# Patient Record
Sex: MALE | Race: BLACK OR AFRICAN AMERICAN | NOT HISPANIC OR LATINO | Employment: STUDENT | URBAN - METROPOLITAN AREA
[De-identification: names, ages, dates, MRNs, and addresses within clinical notes are randomized per-mention and may not be internally consistent; named-entity substitution may affect disease eponyms.]

---

## 2017-07-26 PROCEDURE — 99283 EMERGENCY DEPT VISIT LOW MDM: CPT

## 2017-07-27 ENCOUNTER — HOSPITAL ENCOUNTER (EMERGENCY)
Facility: HOSPITAL | Age: 22
Discharge: HOME | End: 2017-07-27
Attending: EMERGENCY MEDICINE
Payer: COMMERCIAL

## 2017-07-27 VITALS
OXYGEN SATURATION: 100 % | SYSTOLIC BLOOD PRESSURE: 128 MMHG | RESPIRATION RATE: 18 BRPM | BODY MASS INDEX: 22.6 KG/M2 | HEIGHT: 67 IN | TEMPERATURE: 99 F | WEIGHT: 144 LBS | DIASTOLIC BLOOD PRESSURE: 76 MMHG | HEART RATE: 82 BPM

## 2017-07-27 DIAGNOSIS — K52.9 GASTROENTERITIS: Primary | ICD-10-CM

## 2017-07-27 LAB
BACTERIA #/AREA URNS AUTO: NORMAL /HPF
BILIRUB UR QL STRIP: NEGATIVE
CLARITY UR REFRACT.AUTO: CLEAR
COLOR UR AUTO: ABNORMAL
GLUCOSE UR QL STRIP: NEGATIVE
HGB UR QL STRIP: NEGATIVE
HYALINE CASTS UR QL AUTO: 0 /LPF
KETONES UR QL STRIP: NEGATIVE
LEUKOCYTE ESTERASE UR QL STRIP: NEGATIVE
MICROSCOPIC COMMENT: NORMAL
NITRITE UR QL STRIP: NEGATIVE
PH UR STRIP: 6 [PH] (ref 5–8)
PROT UR QL STRIP: ABNORMAL
RBC #/AREA URNS AUTO: 1 /HPF (ref 0–4)
SP GR UR STRIP: 1.02 (ref 1–1.03)
URN SPEC COLLECT METH UR: ABNORMAL
UROBILINOGEN UR STRIP-ACNC: NEGATIVE EU/DL
WBC #/AREA URNS AUTO: 2 /HPF (ref 0–5)

## 2017-07-27 PROCEDURE — 81000 URINALYSIS NONAUTO W/SCOPE: CPT

## 2017-07-27 RX ORDER — ONDANSETRON 4 MG/1
4 TABLET, FILM COATED ORAL EVERY 6 HOURS
Qty: 12 TABLET | Refills: 0 | Status: SHIPPED | OUTPATIENT
Start: 2017-07-27

## 2017-07-27 NOTE — ED PROVIDER NOTES
Encounter Date: 7/26/2017       History     Chief Complaint   Patient presents with    Abdominal Pain     Diarrhea and cramping started Monday. Denies vomiting. Cramping comes and goes. Noticed stool has changed in color. Wiped after passing a bowel movement and blood was on the tissue. complaints of  Dizziness      The history is provided by the patient.   General Illness    The current episode started today. The problem occurs frequently. The problem has been unchanged. The pain is at a severity of 2/10. Nothing relieves the symptoms. Nothing aggravates the symptoms. Associated symptoms include diarrhea and nausea. Pertinent negatives include no fever, no double vision, no eye itching, no photophobia, no abdominal pain, no constipation, no vomiting, no congestion, no ear discharge, no headaches, no muscle aches, no neck pain, no neck stiffness, no cough, no shortness of breath, no URI, no wheezing, no rash, no diaper rash, no discharge, no pain and no eye redness.     Review of patient's allergies indicates:  No Known Allergies  Past Medical History:   Diagnosis Date    GERD (gastroesophageal reflux disease)      Past Surgical History:   Procedure Laterality Date    SPINAL FUSION       History reviewed. No pertinent family history.  Social History   Substance Use Topics    Smoking status: Former Smoker    Smokeless tobacco: Never Used    Alcohol use No     Review of Systems   Constitutional: Negative for fever.   HENT: Negative for congestion and ear discharge.    Eyes: Negative for double vision, photophobia, pain, discharge, redness and itching.   Respiratory: Negative for cough, shortness of breath and wheezing.    Gastrointestinal: Positive for diarrhea and nausea. Negative for abdominal pain, constipation and vomiting.   Musculoskeletal: Negative for neck pain.   Skin: Negative for rash.   Neurological: Negative for headaches.   All other systems reviewed and are negative.      Physical Exam     Initial  Vitals [07/27/17 0012]   BP Pulse Resp Temp SpO2   130/89 86 20 98.3 °F (36.8 °C) 98 %      MAP       102.67         Physical Exam    Nursing note and vitals reviewed.  Constitutional: He appears well-developed and well-nourished.   HENT:   Head: Normocephalic and atraumatic.   Eyes: EOM are normal.   Neck: Normal range of motion. Neck supple.   Cardiovascular: Normal rate, regular rhythm, normal heart sounds and intact distal pulses.   Pulmonary/Chest: Breath sounds normal.   Abdominal: Soft. He exhibits no distension. There is no tenderness.   Musculoskeletal: Normal range of motion.   Neurological: He is alert and oriented to person, place, and time.   Skin: Skin is dry.   Psychiatric: He has a normal mood and affect. His behavior is normal. Judgment and thought content normal.         ED Course   Procedures  Labs Reviewed   URINALYSIS                               ED Course     Clinical Impression:   The encounter diagnosis was Gastroenteritis.    Disposition:   Disposition: Discharged  Condition: Stable                        Keila Bahena MD  07/27/17 0116

## 2021-07-25 ENCOUNTER — IMMUNIZATION (OUTPATIENT)
Dept: PRIMARY CARE CLINIC | Facility: CLINIC | Age: 26
End: 2021-07-25
Payer: COMMERCIAL

## 2021-07-25 DIAGNOSIS — Z23 NEED FOR VACCINATION: Primary | ICD-10-CM

## 2021-07-25 PROCEDURE — 91300 COVID-19, MRNA, LNP-S, PF, 30 MCG/0.3 ML DOSE VACCINE: ICD-10-PCS | Mod: S$GLB,,, | Performed by: INTERNAL MEDICINE

## 2021-07-25 PROCEDURE — 0001A COVID-19, MRNA, LNP-S, PF, 30 MCG/0.3 ML DOSE VACCINE: CPT | Mod: CV19,S$GLB,, | Performed by: INTERNAL MEDICINE

## 2021-07-25 PROCEDURE — 91300 COVID-19, MRNA, LNP-S, PF, 30 MCG/0.3 ML DOSE VACCINE: CPT | Mod: S$GLB,,, | Performed by: INTERNAL MEDICINE

## 2021-07-25 PROCEDURE — 0001A COVID-19, MRNA, LNP-S, PF, 30 MCG/0.3 ML DOSE VACCINE: ICD-10-PCS | Mod: CV19,S$GLB,, | Performed by: INTERNAL MEDICINE

## 2021-08-15 ENCOUNTER — IMMUNIZATION (OUTPATIENT)
Dept: PRIMARY CARE CLINIC | Facility: CLINIC | Age: 26
End: 2021-08-15
Payer: COMMERCIAL

## 2021-08-15 DIAGNOSIS — Z23 NEED FOR VACCINATION: Primary | ICD-10-CM

## 2021-08-15 PROCEDURE — 0002A COVID-19, MRNA, LNP-S, PF, 30 MCG/0.3 ML DOSE VACCINE: CPT | Mod: CV19,S$GLB,, | Performed by: INTERNAL MEDICINE

## 2021-08-15 PROCEDURE — 0002A COVID-19, MRNA, LNP-S, PF, 30 MCG/0.3 ML DOSE VACCINE: ICD-10-PCS | Mod: CV19,S$GLB,, | Performed by: INTERNAL MEDICINE

## 2021-08-15 PROCEDURE — 91300 COVID-19, MRNA, LNP-S, PF, 30 MCG/0.3 ML DOSE VACCINE: ICD-10-PCS | Mod: S$GLB,,, | Performed by: INTERNAL MEDICINE

## 2021-08-15 PROCEDURE — 91300 COVID-19, MRNA, LNP-S, PF, 30 MCG/0.3 ML DOSE VACCINE: CPT | Mod: S$GLB,,, | Performed by: INTERNAL MEDICINE

## 2022-03-21 ENCOUNTER — HOSPITAL ENCOUNTER (EMERGENCY)
Facility: HOSPITAL | Age: 27
Discharge: HOME | End: 2022-03-21
Attending: EMERGENCY MEDICINE
Payer: COMMERCIAL

## 2022-03-21 VITALS
RESPIRATION RATE: 16 BRPM | HEART RATE: 72 BPM | DIASTOLIC BLOOD PRESSURE: 95 MMHG | SYSTOLIC BLOOD PRESSURE: 138 MMHG | BODY MASS INDEX: 25.58 KG/M2 | WEIGHT: 163 LBS | HEIGHT: 67 IN | OXYGEN SATURATION: 97 % | TEMPERATURE: 98 F

## 2022-03-21 DIAGNOSIS — M24.412 RECURRENT ANTERIOR DISLOCATION OF LEFT SHOULDER: Primary | ICD-10-CM

## 2022-03-21 PROCEDURE — 23650 CLTX SHO DSLC W/MNPJ WO ANES: CPT | Mod: LT

## 2022-03-21 PROCEDURE — 99283 EMERGENCY DEPT VISIT LOW MDM: CPT | Mod: 25,ER

## 2022-03-21 PROCEDURE — 25000003 PHARM REV CODE 250: Mod: ER | Performed by: EMERGENCY MEDICINE

## 2022-03-21 RX ORDER — KETOROLAC TROMETHAMINE 10 MG/1
10 TABLET, FILM COATED ORAL EVERY 8 HOURS PRN
Qty: 15 TABLET | Refills: 0 | Status: SHIPPED | OUTPATIENT
Start: 2022-03-21

## 2022-03-21 RX ORDER — KETOROLAC TROMETHAMINE 10 MG/1
10 TABLET, FILM COATED ORAL
Status: COMPLETED | OUTPATIENT
Start: 2022-03-21 | End: 2022-03-21

## 2022-03-21 RX ADMIN — KETOROLAC TROMETHAMINE 10 MG: 10 TABLET, FILM COATED ORAL at 03:03

## 2022-03-21 NOTE — ED PROVIDER NOTES
"Encounter Date: 3/21/2022       History     Chief Complaint   Patient presents with    Shoulder Pain     Pt to the Er with left shoulder dislocation. Pt woke like this 2 hours ago. Reports a hx of this and states "I can usually put it back in but it won't tonight. Pt took nothing for pain     Patient currently presents with a chief complaint of injury to the R shoulder.  This was acquired while the patient was sleeping though he has experienced this before with dislocation of the shoulder.  Patient notes associated symptoms of pain and limited ROM.          Review of patient's allergies indicates:  No Known Allergies  Past Medical History:   Diagnosis Date    GERD (gastroesophageal reflux disease)      Past Surgical History:   Procedure Laterality Date    SPINAL FUSION       History reviewed. No pertinent family history.  Social History     Tobacco Use    Smoking status: Former Smoker    Smokeless tobacco: Never Used   Substance Use Topics    Alcohol use: No    Drug use: No     Review of Systems   Constitutional: Negative for chills and fever.   HENT: Negative for congestion and sore throat.    Respiratory: Negative for chest tightness and shortness of breath.    Cardiovascular: Negative for chest pain and palpitations.   Gastrointestinal: Negative for abdominal pain and vomiting.   Genitourinary: Negative for difficulty urinating and dysuria.   Skin: Negative for color change and rash.   Allergic/Immunologic: Negative for immunocompromised state.   Neurological: Negative for weakness and numbness.   Hematological: Negative for adenopathy.   All other systems reviewed and are negative.    Physical Exam     Initial Vitals [03/21/22 0313]   BP Pulse Resp Temp SpO2   (!) 138/95 79 18 98.3 °F (36.8 °C) 99 %      MAP       --         Physical Exam    Constitutional: He appears well-developed and well-nourished. He is not diaphoretic. No distress.   HENT:   Head: Normocephalic and atraumatic.   Cardiovascular: " Normal rate, regular rhythm, normal heart sounds and intact distal pulses.   Pulmonary/Chest: Breath sounds normal. No respiratory distress.     Neurological: He is alert and oriented to person, place, and time.   Skin: Skin is warm and dry.         ED Course   Orthopedic Injury    Date/Time: 3/21/2022 5:03 AM  Performed by: Jamie Guerrero MD  Authorized by: Jamie Guerrero MD     Location procedure was performed:  [facility] EMERGENCY DEPARTMENT  Consent Done?:  Yes  Universal Protocol:     Verbal consent obtained?: Yes      Risks and benefits: Risks, benefits and alternatives were discussed      Consent given by:  Patient  Injury:     Injury location:  Shoulder    Location details:  Left shoulder    Injury type:  Dislocation    Dislocation type: anterior      Chronicity:  Recurrent      Pre-procedure assessment:     Neurovascular status: Neurovascularly intact      Distal perfusion: normal      Neurological function: normal      Range of motion: reduced        Selections made in this section will also lock the Injury type section above.:     Manipulation performed?: Yes      Reduction method:  External rotation    Reduction method:  External rotation    Reduction method:  External rotation    Reduction method:  External rotation    Reduction method:  External rotation    Reduction method:  External rotation    Reduction successful?: Yes      Confirmation: Reduction confirmed by x-ray      Immobilization:  Sling  Post-procedure assessment:     Neurovascular status: Neurovascularly intact      Distal perfusion: normal      Neurological function: normal      Range of motion: normal         Labs Reviewed - No data to display       Imaging Results          X-Ray Shoulder 2 or More Views Left (In process)               X-Rays:   Independently Interpreted Readings:   Other Readings:  2 views L shoulder shows no evidence of fracture or dislocation    Medications   ketorolac tablet 10 mg (10 mg Oral Given 3/21/22 3410)      Medical Decision Making:   ED Management:  All findings were reviewed with the patient/family in detail.  I see no indication of an emergent process beyond that addressed during our encounter but have duly counseled the patient/family regarding the need for prompt follow-up as well as the indications that should prompt immediate return to the emergency room should new or worrisome developments occur.  The patient has additionally been provided with printed information regarding diagnosis as well as instructions regarding follow up and any medications intended to manage the patient's aforementioned conditions.  The patient/family communicates understanding of all this information and all remaining questions and concerns were addressed at this time.                            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 Provider    ketorolac (TORADOL) 10 mg tablet Take 1 tablet (10 mg total) by mouth every 8 (eight) hours as needed for Pain. 15 tablet 3/21/2022  Jamie Guerrero MD        Follow-up Information     Follow up With Specialties Details Why Contact Info    Jose Suarez MD Family Medicine Schedule an appointment as soon as possible for a visit  reassessment/consideration for specialty referral [facility] [address] [facility] [address]  [phone]      [facility] - Emergency Dept Emergency Medicine Go to  As needed, If symptoms worsen [address] [facility] [address] [phone]           Jamie Guerrero MD  03/21/22 1883

## 2022-03-21 NOTE — ED NOTES
Physical Assessment    LOC: The patient is awake, alert and aware of environment with an appropriate affect, the patient is oriented x 3 and speaking appropriately.     Psych: Patient is calm and cooperative with good eye contact.    APPEARANCE: Patient is clean and non toxic appearing    NEUROLOGIC:  EVAN, Follows commands without difficulty. Speech is clear. No neuro deficits observed.    HEENT: Denies HEENT complaint or injury, moist mucus membranes     RESPIRATORY: Airway is open and patent, respirations are spontaneous; patient has a normal effort and rate. Bilateral breath sounds are clear. Pink nailbeds.     CARDIAC: Patient has a normal rate and rhythm, no peripheral edema noted, capillary refill < 2 seconds. PULSES are symmetrical in all extremities    GI/ : Soft and non tender to palpation, no distention noted.     MUSCULOSKELETAL:  Range of motion limited left shoulder noted. Moves all extremities well except lt shoulder, c/o pain left shoulder, anterior dislocation deformity noted to left shoulder. - NO swelling, Palpable radial pulses bilaterally - sensation intact - cap refill <2 seconds    SKIN: The skin is warm, dry and intact. Patient has normal skin turgor. No rashes or lesions. No Breakdown noted.

## 2022-03-21 NOTE — DISCHARGE INSTRUCTIONS
Thank you for allowing our emergency team to care for you here today.  We certainly hope you are well on your way to feeling better soon.  Please do not hesitate to return to us with any additional concerns that may arise from this or any new problem you encounter.     Our goal in the emergency department is to always give you outstanding care and exceptional service. You may receive a survey by mail or e-mail in the next week regarding your experience in our ED. We would greatly appreciate you completing and returning the survey. Your feedback provides us with a way to recognize our staff who give very good care and it helps us learn how to improve when your experience was below our aspiration of excellence.        Sincerely,         aJmie Guerrero MD  Emergency Medicine  [facility]

## 2024-06-04 ENCOUNTER — OFFICE VISIT (OUTPATIENT)
Dept: UROLOGY | Facility: CLINIC | Age: 29
End: 2024-06-04
Payer: COMMERCIAL

## 2024-06-04 ENCOUNTER — TELEPHONE (OUTPATIENT)
Dept: UROLOGY | Facility: CLINIC | Age: 29
End: 2024-06-04
Payer: COMMERCIAL

## 2024-06-04 ENCOUNTER — LAB VISIT (OUTPATIENT)
Dept: LAB | Facility: HOSPITAL | Age: 29
End: 2024-06-04
Payer: COMMERCIAL

## 2024-06-04 VITALS
HEART RATE: 61 BPM | WEIGHT: 185.88 LBS | DIASTOLIC BLOOD PRESSURE: 85 MMHG | SYSTOLIC BLOOD PRESSURE: 141 MMHG | BODY MASS INDEX: 28.17 KG/M2 | HEIGHT: 68 IN

## 2024-06-04 DIAGNOSIS — Q54.0 GLANULAR HYPOSPADIAS: ICD-10-CM

## 2024-06-04 DIAGNOSIS — Z98.890 H/O DILATION OF URETHRA: ICD-10-CM

## 2024-06-04 DIAGNOSIS — N34.2 URETHRITIS: ICD-10-CM

## 2024-06-04 DIAGNOSIS — N48.89 PENILE SWELLING: Primary | ICD-10-CM

## 2024-06-04 LAB
ALBUMIN SERPL BCP-MCNC: 4.4 G/DL (ref 3.5–5.2)
ALP SERPL-CCNC: 86 U/L (ref 55–135)
ALT SERPL W/O P-5'-P-CCNC: 33 U/L (ref 10–44)
ANION GAP SERPL CALC-SCNC: 9 MMOL/L (ref 8–16)
AST SERPL-CCNC: 32 U/L (ref 10–40)
BILIRUB SERPL-MCNC: 0.8 MG/DL (ref 0.1–1)
BILIRUB SERPL-MCNC: NORMAL MG/DL
BLOOD URINE, POC: NORMAL
BUN SERPL-MCNC: 12 MG/DL (ref 6–20)
CALCIUM SERPL-MCNC: 9.7 MG/DL (ref 8.7–10.5)
CHLORIDE SERPL-SCNC: 105 MMOL/L (ref 95–110)
CLARITY, POC UA: CLEAR
CO2 SERPL-SCNC: 29 MMOL/L (ref 23–29)
COLOR, POC UA: YELLOW
CREAT SERPL-MCNC: 1 MG/DL (ref 0.5–1.4)
EST. GFR  (NO RACE VARIABLE): >60 ML/MIN/1.73 M^2
GLUCOSE SERPL-MCNC: 89 MG/DL (ref 70–110)
GLUCOSE UR QL STRIP: NORMAL
KETONES UR QL STRIP: NORMAL
LEUKOCYTE ESTERASE URINE, POC: NORMAL
NITRITE, POC UA: NORMAL
PH, POC UA: 5
POTASSIUM SERPL-SCNC: 4.3 MMOL/L (ref 3.5–5.1)
PROT SERPL-MCNC: 7.8 G/DL (ref 6–8.4)
PROTEIN, POC: NORMAL
SODIUM SERPL-SCNC: 143 MMOL/L (ref 136–145)
SPECIFIC GRAVITY, POC UA: 1.01
UROBILINOGEN, POC UA: NORMAL

## 2024-06-04 PROCEDURE — 1160F RVW MEDS BY RX/DR IN RCRD: CPT | Mod: CPTII,S$GLB,,

## 2024-06-04 PROCEDURE — 87591 N.GONORRHOEAE DNA AMP PROB: CPT

## 2024-06-04 PROCEDURE — 99204 OFFICE O/P NEW MOD 45 MIN: CPT | Mod: S$GLB,,,

## 2024-06-04 PROCEDURE — 3008F BODY MASS INDEX DOCD: CPT | Mod: CPTII,S$GLB,,

## 2024-06-04 PROCEDURE — 3077F SYST BP >= 140 MM HG: CPT | Mod: CPTII,S$GLB,,

## 2024-06-04 PROCEDURE — 3079F DIAST BP 80-89 MM HG: CPT | Mod: CPTII,S$GLB,,

## 2024-06-04 PROCEDURE — 36415 COLL VENOUS BLD VENIPUNCTURE: CPT

## 2024-06-04 PROCEDURE — 87661 TRICHOMONAS VAGINALIS AMPLIF: CPT

## 2024-06-04 PROCEDURE — 99999 PR PBB SHADOW E&M-EST. PATIENT-LVL III: CPT | Mod: PBBFAC,,,

## 2024-06-04 PROCEDURE — 1159F MED LIST DOCD IN RCRD: CPT | Mod: CPTII,S$GLB,,

## 2024-06-04 PROCEDURE — 81002 URINALYSIS NONAUTO W/O SCOPE: CPT | Mod: S$GLB,,,

## 2024-06-04 PROCEDURE — 80053 COMPREHEN METABOLIC PANEL: CPT

## 2024-06-04 RX ORDER — TAMSULOSIN HYDROCHLORIDE 0.4 MG/1
0.4 CAPSULE ORAL DAILY
COMMUNITY

## 2024-06-04 RX ORDER — OXAPROZIN 600 MG/1
600 TABLET, FILM COATED ORAL EVERY 12 HOURS
Qty: 28 TABLET | Refills: 0 | Status: SHIPPED | OUTPATIENT
Start: 2024-06-04 | End: 2024-06-18

## 2024-06-04 RX ORDER — BACLOFEN 10 MG/1
10 GRANULE ORAL 3 TIMES DAILY
COMMUNITY

## 2024-06-04 NOTE — PROGRESS NOTES
CHIEF COMPLAINT:  Urethral burning      HISTORY OF PRESENTING ILLINESS:  Zackary Rodriguez is a 28 y.o. male new to [facility]. Presents today for urethral burning x 2 years. H/o of urethral dilation in the past, similar symptoms prior to dilation. States he was started on Flomax by a physician in [place], [place] recently. Stream is normal, does not split or spray. States urine culture and STD testing negative 4 months ago. Experiences urethral burning throughout the day, worse when he has poor water intake. He is here today with his grandmother.             REVIEW OF SYSTEMS:  Review of Systems   Constitutional:  Negative for chills and fever.   HENT:  Negative for congestion and sore throat.    Respiratory:  Negative for cough and shortness of breath.    Cardiovascular:  Negative for chest pain and palpitations.   Gastrointestinal:  Negative for nausea and vomiting.   Genitourinary:  Negative for dysuria, flank pain, frequency, hematuria and urgency.   Neurological:  Negative for dizziness and headaches.         PATIENT HISTORY:    Past Medical History:   Diagnosis Date    GERD (gastroesophageal reflux disease)        Past Surgical History:   Procedure Laterality Date    SPINAL FUSION         No family history on file.    Social History     Socioeconomic History    Marital status: Single   Tobacco Use    Smoking status: Former    Smokeless tobacco: Never   Substance and Sexual Activity    Alcohol use: No    Drug use: No    Sexual activity: Not Currently     Social Determinants of Health     Financial Resource Strain: Low Risk  (6/4/2024)    Overall Financial Resource Strain (CARDIA)     Difficulty of Paying Living Expenses: Not hard at all   Food Insecurity: No Food Insecurity (6/4/2024)    Hunger Vital Sign     Worried About Running Out of Food in the Last Year: Never true     Ran Out of Food in the Last Year: Never true   Physical Activity: Insufficiently Active (6/4/2024)    Exercise Vital Sign     Days of Exercise  per Week: 4 days     Minutes of Exercise per Session: 30 min   Stress: No Stress Concern Present (6/4/2024)    [employer] [place] [employer] - Occupational Stress Questionnaire     Feeling of Stress : Not at all   Housing Stability: Unknown (6/4/2024)    Housing Stability Vital Sign     Unable to Pay for Housing in the Last Year: No       Allergies:  Patient has no known allergies.    Medications:    Current Outpatient Medications:     baclofen (LYVISPAH) 10 mg GrPk, Take 10 mg by mouth 3 (three) times daily., Disp: , Rfl:     oxaprozin (DAYPRO) 600 mg tablet, Take 1 tablet (600 mg total) by mouth every 12 (twelve) hours. Take with food. for 14 days, Disp: 28 tablet, Rfl: 0    tamsulosin (FLOMAX) 0.4 mg Cap, Take 0.4 mg by mouth once daily., Disp: , Rfl:     PHYSICAL EXAMINATION:  Physical Exam  Constitutional:       Appearance: Normal appearance.   HENT:      Head: Normocephalic and atraumatic.      Right Ear: External ear normal.      Left Ear: External ear normal.      Nose: Nose normal.      Mouth/Throat:      Mouth: Mucous membranes are moist.   Pulmonary:      Effort: Pulmonary effort is normal. No respiratory distress.   Genitourinary:     Comments: Glanular hypospadias   Skin:     General: Skin is warm and dry.   Neurological:      General: No focal deficit present.      Mental Status: He is alert and oriented to person, place, and time.   Psychiatric:         Mood and Affect: Mood normal.         Behavior: Behavior normal.           LABS:  UA WNL         IMPRESSION:    Encounter Diagnoses   Name Primary?    Penile swelling Yes    Urethritis     H/O dilation of urethra     Glanular hypospadias          Assessment:       1. Penile swelling    2. Urethritis    3. H/O dilation of urethra    4. Glanular hypospadias        Plan:   - Urine sent for gonorrhea/chlamydia/trichomonas. Will call with results.   - Continue Flomax as prescribed.  - CMP today for renal function baseline. 14 day course of 600 mg  daypro q 12 hr with food for discomfort.   - Referral placed to Dr. Agee for further evaluation due to hx of urethral dilation in the past.     RTC for consult with Dr. Agee    I spent 45 minutes with the patient of which more than half was spent in direct consultation with the patient in regards to our treatment and plan.  We addressed the office findings and recent labs; any need to go ER today.   Education and recommendations of today's plan of care including home remedies and needed follow up with PCP.   We discussed the chief complaints; reviewed the LUTS and the possible contributory factors.   Reassurance no infection or visible blood seen in today's urine sample

## 2024-06-05 LAB
C TRACH DNA SPEC QL NAA+PROBE: NOT DETECTED
N GONORRHOEA DNA SPEC QL NAA+PROBE: NOT DETECTED

## 2024-06-06 LAB
SPECIMEN SOURCE: NORMAL
T VAGINALIS RRNA SPEC QL NAA+PROBE: NEGATIVE

## 2024-06-07 ENCOUNTER — TELEPHONE (OUTPATIENT)
Dept: UROLOGY | Facility: CLINIC | Age: 29
End: 2024-06-07
Payer: COMMERCIAL

## 2024-07-12 ENCOUNTER — TELEPHONE (OUTPATIENT)
Dept: UROLOGY | Facility: CLINIC | Age: 29
End: 2024-07-12
Payer: COMMERCIAL

## 2024-07-12 ENCOUNTER — TELEPHONE (OUTPATIENT)
Dept: UROLOGY | Facility: CLINIC | Age: 29
End: 2024-07-12

## 2024-07-12 ENCOUNTER — OFFICE VISIT (OUTPATIENT)
Dept: UROLOGY | Facility: CLINIC | Age: 29
End: 2024-07-12
Payer: COMMERCIAL

## 2024-07-12 VITALS
DIASTOLIC BLOOD PRESSURE: 79 MMHG | SYSTOLIC BLOOD PRESSURE: 137 MMHG | WEIGHT: 188.69 LBS | HEIGHT: 68 IN | BODY MASS INDEX: 28.6 KG/M2 | HEART RATE: 68 BPM

## 2024-07-12 DIAGNOSIS — R30.0 DYSURIA: ICD-10-CM

## 2024-07-12 DIAGNOSIS — Z98.890 H/O DILATION OF URETHRA: ICD-10-CM

## 2024-07-12 DIAGNOSIS — N35.919 STRICTURE OF MALE URETHRA, UNSPECIFIED STRICTURE TYPE: Primary | ICD-10-CM

## 2024-07-12 DIAGNOSIS — Z98.890 H/O SPINAL SURGERY: ICD-10-CM

## 2024-07-12 DIAGNOSIS — N34.2 URETHRITIS: Primary | ICD-10-CM

## 2024-07-12 PROCEDURE — 87086 URINE CULTURE/COLONY COUNT: CPT | Performed by: UROLOGY

## 2024-07-12 PROCEDURE — 99999 PR PBB SHADOW E&M-EST. PATIENT-LVL IV: CPT | Mod: PBBFAC,,, | Performed by: UROLOGY

## 2024-07-12 RX ORDER — DOXYCYCLINE 100 MG/1
100 CAPSULE ORAL 2 TIMES DAILY
Qty: 14 CAPSULE | Refills: 0 | Status: SHIPPED | OUTPATIENT
Start: 2024-07-12 | End: 2024-07-19

## 2024-07-12 NOTE — TELEPHONE ENCOUNTER
Spoke to the pt while in clinic to offer surgery date of 7/22 at the [place] location, pt accepted. Went over pre-op instructions while in clinic and informed the pt someone from the [place] location will call with arrival time. Pt verbalized understanding.   Dr. stauffer

## 2024-07-12 NOTE — PATIENT INSTRUCTIONS
Kimi (Ackerly) Jorge, my surgery scheduler will schedule your surgery ([phone]).  The risks and benefits of the procedure were discussed with the patient in detail.    The patient was told to stop all blood thinners prior to surgery.    Antibiotic soap shower a night before surgery      Start doxycycline Saturday before surgery

## 2024-07-12 NOTE — H&P (VIEW-ONLY)
CHIEF COMPLAINT:  Urethral burning, urgency      HISTORY OF PRESENTING ILLINESS:  Zackary Rodriguez is a 28 y.o. male new to [facility]. Presents today for urethral burning x 2 years. H/o of urethral dilation in the past, similar symptoms prior to dilation. States he was started on Flomax by a physician in [place], [place] recently. Stream is normal, does not split or spray. States urine culture and STD testing negative 4 months ago. Experiences urethral burning throughout the day, worse when he has poor water intake. He is here today with his grandmother.     S/p spinal surgery in 2013 for scoliosis at [facility]            REVIEW OF SYSTEMS:  Review of Systems   Constitutional:  Negative for chills and fever.   HENT:  Negative for congestion and sore throat.    Respiratory:  Negative for cough and shortness of breath.    Cardiovascular:  Negative for chest pain and palpitations.   Gastrointestinal:  Negative for nausea and vomiting.   Genitourinary:  Negative for dysuria, flank pain, frequency, hematuria and urgency.   Neurological:  Negative for dizziness and headaches.         PATIENT HISTORY:    Past Medical History:   Diagnosis Date    GERD (gastroesophageal reflux disease)        Past Surgical History:   Procedure Laterality Date    SPINAL FUSION         No family history on file.    Social History     Socioeconomic History    Marital status: Single   Tobacco Use    Smoking status: Former    Smokeless tobacco: Never   Substance and Sexual Activity    Alcohol use: No    Drug use: No    Sexual activity: Not Currently     Social Determinants of Health     Financial Resource Strain: Low Risk  (6/4/2024)    Overall Financial Resource Strain (CARDIA)     Difficulty of Paying Living Expenses: Not hard at all   Food Insecurity: No Food Insecurity (6/4/2024)    Hunger Vital Sign     Worried About Running Out of Food in the Last Year: Never true     Ran Out of Food in the Last Year: Never true   Physical  Activity: Insufficiently Active (6/4/2024)    Exercise Vital Sign     Days of Exercise per Week: 4 days     Minutes of Exercise per Session: 30 min   Stress: No Stress Concern Present (6/4/2024)    [employer] [place] [employer] - Occupational Stress Questionnaire     Feeling of Stress : Not at all   Housing Stability: Unknown (6/4/2024)    Housing Stability Vital Sign     Unable to Pay for Housing in the Last Year: No       Allergies:  Patient has no known allergies.    Medications:    Current Outpatient Medications:     baclofen (LYVISPAH) 10 mg GrPk, Take 10 mg by mouth 3 (three) times daily. (Patient not taking: Reported on 7/12/2024), Disp: , Rfl:     doxycycline (VIBRAMYCIN) 100 MG Cap, Take 1 capsule (100 mg total) by mouth 2 (two) times daily. for 14 doses, Disp: 14 capsule, Rfl: 0    tamsulosin (FLOMAX) 0.4 mg Cap, Take 0.4 mg by mouth once daily. (Patient not taking: Reported on 7/12/2024), Disp: , Rfl:     PHYSICAL EXAMINATION:  Physical Exam  Constitutional:       Appearance: Normal appearance.   HENT:      Head: Normocephalic and atraumatic.      Right Ear: External ear normal.      Left Ear: External ear normal.      Nose: Nose normal.      Mouth/Throat:      Mouth: Mucous membranes are moist.   Pulmonary:      Effort: Pulmonary effort is normal. No respiratory distress.   Genitourinary:     Comments: Glanular hypospadias   Skin:     General: Skin is warm and dry.   Neurological:      General: No focal deficit present.      Mental Status: He is alert and oriented to person, place, and time.   Psychiatric:         Mood and Affect: Mood normal.         Behavior: Behavior normal.           LABS:  UA WNL         IMPRESSION:    Encounter Diagnoses   Name Primary?    Urethritis Yes    H/O dilation of urethra     H/O Spinal surgery          Assessment:       Urethritis  -     Urine culture  -     doxycycline (VIBRAMYCIN) 100 MG Cap; Take 1 capsule (100 mg total) by mouth 2 (two) times daily. for 14  doses  Dispense: 14 capsule; Refill: 0    H/O dilation of urethra  -     Ambulatory referral/consult to Urology    H/O Spinal surgery  -     X-Ray Abdomen AP 1 View; Future; Expected date: 07/12/2024       Plan:   -he has experienced the problem with urination since he had a spine surgery over 10 years ago.  After russ catheter was removed, he began having the problem.  Had urethral dilation 1 year later and has done well until now.  So will evaluate him with RUG, cysto urethral dilation under general anesthesia.  All questions answered.  He will start doxy 3 days before his surgery.  So far no improvement noted on flomax.    Follow up in about 10 days (around 7/22/2024), or RUG, cysto urethral dilalation at [facility].

## 2024-07-12 NOTE — PROGRESS NOTES
CHIEF COMPLAINT:  Urethral burning, urgency      HISTORY OF PRESENTING ILLINESS:  Zackary Rodriguez is a 28 y.o. male new to [facility]. Presents today for urethral burning x 2 years. H/o of urethral dilation in the past, similar symptoms prior to dilation. States he was started on Flomax by a physician in [place], [place] recently. Stream is normal, does not split or spray. States urine culture and STD testing negative 4 months ago. Experiences urethral burning throughout the day, worse when he has poor water intake. He is here today with his grandmother.     S/p spinal surgery in 2013 for scoliosis at [facility]            REVIEW OF SYSTEMS:  Review of Systems   Constitutional:  Negative for chills and fever.   HENT:  Negative for congestion and sore throat.    Respiratory:  Negative for cough and shortness of breath.    Cardiovascular:  Negative for chest pain and palpitations.   Gastrointestinal:  Negative for nausea and vomiting.   Genitourinary:  Negative for dysuria, flank pain, frequency, hematuria and urgency.   Neurological:  Negative for dizziness and headaches.         PATIENT HISTORY:    Past Medical History:   Diagnosis Date    GERD (gastroesophageal reflux disease)        Past Surgical History:   Procedure Laterality Date    SPINAL FUSION         No family history on file.    Social History     Socioeconomic History    Marital status: Single   Tobacco Use    Smoking status: Former    Smokeless tobacco: Never   Substance and Sexual Activity    Alcohol use: No    Drug use: No    Sexual activity: Not Currently     Social Determinants of Health     Financial Resource Strain: Low Risk  (6/4/2024)    Overall Financial Resource Strain (CARDIA)     Difficulty of Paying Living Expenses: Not hard at all   Food Insecurity: No Food Insecurity (6/4/2024)    Hunger Vital Sign     Worried About Running Out of Food in the Last Year: Never true     Ran Out of Food in the Last Year: Never true   Physical  Activity: Insufficiently Active (6/4/2024)    Exercise Vital Sign     Days of Exercise per Week: 4 days     Minutes of Exercise per Session: 30 min   Stress: No Stress Concern Present (6/4/2024)    [employer] [place] [employer] - Occupational Stress Questionnaire     Feeling of Stress : Not at all   Housing Stability: Unknown (6/4/2024)    Housing Stability Vital Sign     Unable to Pay for Housing in the Last Year: No       Allergies:  Patient has no known allergies.    Medications:    Current Outpatient Medications:     baclofen (LYVISPAH) 10 mg GrPk, Take 10 mg by mouth 3 (three) times daily. (Patient not taking: Reported on 7/12/2024), Disp: , Rfl:     doxycycline (VIBRAMYCIN) 100 MG Cap, Take 1 capsule (100 mg total) by mouth 2 (two) times daily. for 14 doses, Disp: 14 capsule, Rfl: 0    tamsulosin (FLOMAX) 0.4 mg Cap, Take 0.4 mg by mouth once daily. (Patient not taking: Reported on 7/12/2024), Disp: , Rfl:     PHYSICAL EXAMINATION:  Physical Exam  Constitutional:       Appearance: Normal appearance.   HENT:      Head: Normocephalic and atraumatic.      Right Ear: External ear normal.      Left Ear: External ear normal.      Nose: Nose normal.      Mouth/Throat:      Mouth: Mucous membranes are moist.   Pulmonary:      Effort: Pulmonary effort is normal. No respiratory distress.   Genitourinary:     Comments: Glanular hypospadias   Skin:     General: Skin is warm and dry.   Neurological:      General: No focal deficit present.      Mental Status: He is alert and oriented to person, place, and time.   Psychiatric:         Mood and Affect: Mood normal.         Behavior: Behavior normal.           LABS:  UA WNL         IMPRESSION:    Encounter Diagnoses   Name Primary?    Urethritis Yes    H/O dilation of urethra     H/O Spinal surgery          Assessment:       Urethritis  -     Urine culture  -     doxycycline (VIBRAMYCIN) 100 MG Cap; Take 1 capsule (100 mg total) by mouth 2 (two) times daily. for 14  doses  Dispense: 14 capsule; Refill: 0    H/O dilation of urethra  -     Ambulatory referral/consult to Urology    H/O Spinal surgery  -     X-Ray Abdomen AP 1 View; Future; Expected date: 07/12/2024       Plan:   -he has experienced the problem with urination since he had a spine surgery over 10 years ago.  After russ catheter was removed, he began having the problem.  Had urethral dilation 1 year later and has done well until now.  So will evaluate him with RUG, cysto urethral dilation under general anesthesia.  All questions answered.  He will start doxy 3 days before his surgery.  So far no improvement noted on flomax.    Follow up in about 10 days (around 7/22/2024), or RUG, cysto urethral dilalation at [facility].

## 2024-07-15 NOTE — TELEPHONE ENCOUNTER
Stricture of male urethra, unspecified stricture type  -     Case Request Operating Room: CYSTOSCOPY, FLEXIBLE, CYSTOSCOPY, WITH URETHRAL DILATION, URETHROGRAM, RETROGRADE    H/O Spinal surgery  -     Case Request Operating Room: CYSTOSCOPY, FLEXIBLE, CYSTOSCOPY, WITH URETHRAL DILATION, URETHROGRAM, RETROGRADE    Dysuria  -     Case Request Operating Room: CYSTOSCOPY, FLEXIBLE, CYSTOSCOPY, WITH URETHRAL DILATION, URETHROGRAM, RETROGRADE

## 2024-07-19 NOTE — PRE-PROCEDURE INSTRUCTIONS
Patient was informed of pre-procedure instructions and arrival time. Pt verbalized understanding and is to be accompanied by Mother.    Dear Zackary ,     You are scheduled for a procedure with Dr. Agee on 7/22/2024. Your scheduled arrival time is 6:30 am.  This arrival time is roughly 2 hours before your anticipated procedure time to allow sufficient time for pre-op.  Please wear comfortable clothing  This procedure will take place at the [facility] at the corner of [facility] ([place], [place]).  [facility] [place] [facility] to [facility]. The address is:     [address] [place] [address].  [place] [id number]     After entering the building, proceed to the second floor and check in with registration.      Your fasting instructions are as follows:     Nothing to eat after midnight the evening before your surgery.   You may drink clear liquids up until 2 hours prior to your arrival time.      You MUST have a responsible adult to bring you home.     The morning of your procedure, please hold the following medications:  The evening before and morning of your procedure, please hold the following medications:  -Aspirin and Aspirin-containing products (Goody's powder, Excedrin)  -NSAIDs (Advil, Ibuprofen, Aleve, Diclofenac)  -Vitamins/Supplements  -Herbal remedies/Teas  -Stimulants (Adderall, Vyvanse, Adipex)  -Diabetic medication (Please bring with you day of procedure)  -Prescription creams/gels/lotions        *May take Tylenol if needed         The evening before and morning of your procedure, take a shower using antibacterial soap (ex: Hibiclens or Dial antibacterial soap). DO NOT apply deodorant, lotion, cologne, or anything else to the skin. Do not wear jewelry or bring any valuables with you.  .     Please do not wear contact lenses the day of your procedure.   Bring any inhalers that you may need.     If you have any procedure-specific questions, please call your surgeon's office. Any  other questions, don't hesitate to call at [phone]     Thanks,  Pre-Admit Testing  Anesthesia Dept OC

## 2024-07-22 ENCOUNTER — ANESTHESIA EVENT (OUTPATIENT)
Dept: SURGERY | Facility: HOSPITAL | Age: 29
End: 2024-07-22
Payer: COMMERCIAL

## 2024-07-22 ENCOUNTER — ANESTHESIA (OUTPATIENT)
Dept: SURGERY | Facility: HOSPITAL | Age: 29
End: 2024-07-22
Payer: COMMERCIAL

## 2024-07-22 ENCOUNTER — HOSPITAL ENCOUNTER (OUTPATIENT)
Facility: HOSPITAL | Age: 29
Discharge: HOME | End: 2024-07-22
Attending: UROLOGY | Admitting: UROLOGY
Payer: COMMERCIAL

## 2024-07-22 VITALS
HEART RATE: 67 BPM | DIASTOLIC BLOOD PRESSURE: 77 MMHG | RESPIRATION RATE: 14 BRPM | TEMPERATURE: 99 F | OXYGEN SATURATION: 100 % | SYSTOLIC BLOOD PRESSURE: 121 MMHG

## 2024-07-22 DIAGNOSIS — N30.10 IC (INTERSTITIAL CYSTITIS): Primary | ICD-10-CM

## 2024-07-22 DIAGNOSIS — N35.919 URETHRAL STRICTURE: ICD-10-CM

## 2024-07-22 PROCEDURE — C1769 GUIDE WIRE: HCPCS | Performed by: UROLOGY

## 2024-07-22 PROCEDURE — 71000015 HC POSTOP RECOV 1ST HR: Performed by: UROLOGY

## 2024-07-22 PROCEDURE — 63600175 PHARM REV CODE 636 W HCPCS: Performed by: NURSE ANESTHETIST, CERTIFIED REGISTERED

## 2024-07-22 PROCEDURE — 63600175 PHARM REV CODE 636 W HCPCS

## 2024-07-22 PROCEDURE — 99900035 HC TECH TIME PER 15 MIN (STAT)

## 2024-07-22 PROCEDURE — 36000707: Performed by: UROLOGY

## 2024-07-22 PROCEDURE — 25000003 PHARM REV CODE 250

## 2024-07-22 PROCEDURE — 74450 X-RAY URETHRA/BLADDER: CPT | Mod: 26,,, | Performed by: UROLOGY

## 2024-07-22 PROCEDURE — 94761 N-INVAS EAR/PLS OXIMETRY MLT: CPT

## 2024-07-22 PROCEDURE — 25000003 PHARM REV CODE 250: Performed by: UROLOGY

## 2024-07-22 PROCEDURE — 25000003 PHARM REV CODE 250: Performed by: NURSE ANESTHETIST, CERTIFIED REGISTERED

## 2024-07-22 PROCEDURE — 71000033 HC RECOVERY, INTIAL HOUR: Performed by: UROLOGY

## 2024-07-22 PROCEDURE — 51610 INJECTION FOR BLADDER X-RAY: CPT | Mod: 51,,, | Performed by: UROLOGY

## 2024-07-22 PROCEDURE — 25500020 PHARM REV CODE 255: Performed by: UROLOGY

## 2024-07-22 PROCEDURE — 37000008 HC ANESTHESIA 1ST 15 MINUTES: Performed by: UROLOGY

## 2024-07-22 PROCEDURE — 52260 CYSTOSCOPY AND TREATMENT: CPT | Mod: ,,, | Performed by: UROLOGY

## 2024-07-22 PROCEDURE — 36000706: Performed by: UROLOGY

## 2024-07-22 PROCEDURE — 37000009 HC ANESTHESIA EA ADD 15 MINS: Performed by: UROLOGY

## 2024-07-22 RX ORDER — PROPOFOL 10 MG/ML
VIAL (ML) INTRAVENOUS CONTINUOUS PRN
Status: DISCONTINUED | OUTPATIENT
Start: 2024-07-22 | End: 2024-07-22

## 2024-07-22 RX ORDER — HYDROMORPHONE HYDROCHLORIDE 1 MG/ML
0.2 INJECTION, SOLUTION INTRAMUSCULAR; INTRAVENOUS; SUBCUTANEOUS EVERY 5 MIN PRN
Status: DISCONTINUED | OUTPATIENT
Start: 2024-07-22 | End: 2024-07-22 | Stop reason: HOSPADM

## 2024-07-22 RX ORDER — HYDROXYZINE HYDROCHLORIDE 25 MG/1
25 TABLET, FILM COATED ORAL 3 TIMES DAILY
Qty: 90 TABLET | Refills: 3 | Status: SHIPPED | OUTPATIENT
Start: 2024-07-22 | End: 2025-07-22

## 2024-07-22 RX ORDER — PROCHLORPERAZINE EDISYLATE 5 MG/ML
5 INJECTION INTRAMUSCULAR; INTRAVENOUS EVERY 30 MIN PRN
Status: DISCONTINUED | OUTPATIENT
Start: 2024-07-22 | End: 2024-07-22 | Stop reason: HOSPADM

## 2024-07-22 RX ORDER — FENTANYL CITRATE 50 UG/ML
INJECTION, SOLUTION INTRAMUSCULAR; INTRAVENOUS
Status: DISCONTINUED | OUTPATIENT
Start: 2024-07-22 | End: 2024-07-22

## 2024-07-22 RX ORDER — FENTANYL CITRATE 50 UG/ML
25 INJECTION, SOLUTION INTRAMUSCULAR; INTRAVENOUS EVERY 5 MIN PRN
Status: DISCONTINUED | OUTPATIENT
Start: 2024-07-22 | End: 2024-07-22 | Stop reason: HOSPADM

## 2024-07-22 RX ORDER — DOXYCYCLINE HYCLATE 50 MG/1
50 CAPSULE ORAL
Status: ON HOLD | COMMUNITY
End: 2024-07-22 | Stop reason: HOSPADM

## 2024-07-22 RX ORDER — ONDANSETRON HYDROCHLORIDE 2 MG/ML
INJECTION, SOLUTION INTRAVENOUS
Status: DISCONTINUED | OUTPATIENT
Start: 2024-07-22 | End: 2024-07-22

## 2024-07-22 RX ORDER — ONDANSETRON HYDROCHLORIDE 2 MG/ML
4 INJECTION, SOLUTION INTRAVENOUS DAILY PRN
Status: DISCONTINUED | OUTPATIENT
Start: 2024-07-22 | End: 2024-07-22 | Stop reason: HOSPADM

## 2024-07-22 RX ORDER — PHENAZOPYRIDINE HYDROCHLORIDE 200 MG/1
200 TABLET, FILM COATED ORAL 3 TIMES DAILY PRN
Qty: 21 TABLET | Refills: 0 | Status: SHIPPED | OUTPATIENT
Start: 2024-07-22 | End: 2024-07-29

## 2024-07-22 RX ORDER — OXYCODONE AND ACETAMINOPHEN 5; 325 MG/1; MG/1
1 TABLET ORAL
Status: DISCONTINUED | OUTPATIENT
Start: 2024-07-22 | End: 2024-07-22 | Stop reason: HOSPADM

## 2024-07-22 RX ORDER — NITROFURANTOIN 25; 75 MG/1; MG/1
100 CAPSULE ORAL 2 TIMES DAILY
Qty: 6 CAPSULE | Refills: 0 | Status: SHIPPED | OUTPATIENT
Start: 2024-07-22 | End: 2024-07-25

## 2024-07-22 RX ORDER — MIDAZOLAM HYDROCHLORIDE 1 MG/ML
INJECTION INTRAMUSCULAR; INTRAVENOUS
Status: DISCONTINUED | OUTPATIENT
Start: 2024-07-22 | End: 2024-07-22

## 2024-07-22 RX ORDER — PROPOFOL 10 MG/ML
VIAL (ML) INTRAVENOUS
Status: DISCONTINUED | OUTPATIENT
Start: 2024-07-22 | End: 2024-07-22

## 2024-07-22 RX ORDER — GLUCAGON 1 MG
1 KIT INJECTION
Status: DISCONTINUED | OUTPATIENT
Start: 2024-07-22 | End: 2024-07-22 | Stop reason: HOSPADM

## 2024-07-22 RX ORDER — LIDOCAINE HYDROCHLORIDE 20 MG/ML
INJECTION INTRAVENOUS
Status: DISCONTINUED | OUTPATIENT
Start: 2024-07-22 | End: 2024-07-22

## 2024-07-22 RX ORDER — LIDOCAINE HYDROCHLORIDE 20 MG/ML
JELLY TOPICAL
Status: DISCONTINUED | OUTPATIENT
Start: 2024-07-22 | End: 2024-07-22 | Stop reason: HOSPADM

## 2024-07-22 RX ORDER — FAMOTIDINE 40 MG/1
40 TABLET, FILM COATED ORAL NIGHTLY PRN
Qty: 30 TABLET | Refills: 11 | Status: SHIPPED | OUTPATIENT
Start: 2024-07-22 | End: 2025-07-22

## 2024-07-22 RX ADMIN — PROPOFOL 125 MCG/KG/MIN: 10 INJECTION, EMULSION INTRAVENOUS at 09:07

## 2024-07-22 RX ADMIN — MIDAZOLAM HYDROCHLORIDE 2 MG: 1 INJECTION, SOLUTION INTRAMUSCULAR; INTRAVENOUS at 09:07

## 2024-07-22 RX ADMIN — PROPOFOL 70 MG: 10 INJECTION, EMULSION INTRAVENOUS at 09:07

## 2024-07-22 RX ADMIN — LIDOCAINE HYDROCHLORIDE 100 MG: 20 INJECTION INTRAVENOUS at 09:07

## 2024-07-22 RX ADMIN — ONDANSETRON 4 MG: 2 INJECTION INTRAMUSCULAR; INTRAVENOUS at 09:07

## 2024-07-22 RX ADMIN — CEFAZOLIN 2 G: 2 INJECTION, POWDER, FOR SOLUTION INTRAMUSCULAR; INTRAVENOUS at 09:07

## 2024-07-22 RX ADMIN — FENTANYL CITRATE 50 MCG: 50 INJECTION, SOLUTION INTRAMUSCULAR; INTRAVENOUS at 09:07

## 2024-07-22 RX ADMIN — SODIUM CHLORIDE: 0.9 INJECTION, SOLUTION INTRAVENOUS at 09:07

## 2024-07-22 NOTE — TRANSFER OF CARE
Anesthesia Transfer of Care Note    Patient: Zackary Rodriguez    Procedure(s) Performed: Procedure(s) (LRB):  CYSTOSCOPY, FLEXIBLE (N/A)  URETHROGRAM, RETROGRADE (N/A)  CYSTOSCOPY, WITH BLADDER HYDRODISTENSION    Patient location: PACU    Anesthesia Type: general    Transport from OR: Transported from OR on room air with adequate spontaneous ventilation    Post pain: adequate analgesia    Post assessment: no apparent anesthetic complications and tolerated procedure well    Post vital signs: stable    Level of consciousness: responds to stimulation and sedated    Nausea/Vomiting: no nausea/vomiting    Complications: none    Transfer of care protocol was followed      Last vitals: Visit Vitals  BP (!) 150/93 (Patient Position: Sitting)   Pulse 78   Temp 37.1 °C (98.7 °F)   Resp 16   SpO2 98%

## 2024-07-22 NOTE — ANESTHESIA POSTPROCEDURE EVALUATION
Anesthesia Post Evaluation    Patient: Zackary Rodriguez    Procedure(s) Performed: Procedure(s) (LRB):  CYSTOSCOPY, FLEXIBLE (N/A)  URETHROGRAM, RETROGRADE (N/A)  CYSTOSCOPY, WITH BLADDER HYDRODISTENSION    Final Anesthesia Type: general      Patient location during evaluation: PACU  Patient participation: Yes- Able to Participate  Level of consciousness: awake and alert  Post-procedure vital signs: reviewed and stable  Pain management: adequate  Airway patency: patent    PONV status at discharge: No PONV  Anesthetic complications: no      Cardiovascular status: stable  Respiratory status: spontaneous ventilation  Hydration status: euvolemic  Follow-up not needed.          Vitals Value Taken Time   /77 07/22/24 1030   Temp 37 °C (98.6 °F) 07/22/24 0955   Pulse 67 07/22/24 1030   Resp 14 07/22/24 1030   SpO2 100 % 07/22/24 1030         Event Time   Out of Recovery 10:25:55         Pain/Jewel Score: Jewel Score: 10 (7/22/2024 10:27 AM)

## 2024-07-22 NOTE — PLAN OF CARE
Pt in preop bay 25, VSS,and IV inserted. Pt denies any open wounds on body or the use of any weight loss injections. Pt needs updated H&P, otherwise ready to roll.    Procedural consents verified with pt.

## 2024-07-22 NOTE — BRIEF OP NOTE
[facility]  Brief Operative Note    Surgery Date: 7/22/2024     Surgeons and Role:     * Chemo Agee MD - Primary     * Maninder Crawford MD - Resident Assisting    Assisting Surgeon: None    Pre-op Diagnosis:  Stricture of male urethra, unspecified stricture type [N35.919]  H/O Spinal surgery [Z98.890]  Dysuria [R30.0]    Post-op Diagnosis:  Post-Op Diagnosis Codes:     * Stricture of male urethra, unspecified stricture type [N35.919]     * H/O Spinal surgery [Z98.890]     * Dysuria [R30.0]    Procedure(s) (LRB):  CYSTOSCOPY, FLEXIBLE (N/A)  URETHROGRAM, RETROGRADE (N/A)  CYSTOSCOPY, WITH BLADDER HYDRODISTENSION    Anesthesia: General    Operative Findings: RUG and flexible cysto showing normal urethra, no stricture noted, bladder with significant hyperemia. Hydro-distention performed, normal bladder capacity, no terminal hematuria noted.     Estimated Blood Loss: min         Specimens:   Specimen (24h ago, onward)      None              Discharge Note    OUTCOME: Patient tolerated treatment/procedure well without complication and is now ready for discharge.    DISPOSITION: Home or Self Care    FINAL DIAGNOSIS:  same as above    FOLLOWUP: In clinic    DISCHARGE INSTRUCTIONS:  patient can be discharged home.

## 2024-07-22 NOTE — OP NOTE
[facility]  Urology Department  Operative Note    Date of Procedure: 7/22/2024     Pre-Operative Diagnosis:   Stricture of male urethra, unspecified stricture type [N35.919]  H/O Spinal surgery [Z98.890]  Dysuria [R30.0]    Post-Operative Diagnosis: same    Procedure:   Retrograde urethrogram   Flexible cystoscopy  Cystoscopy with hydro-distension    Primary: Chemo Agee MD    Assisting Surgeon: Maninder Crawford MD    Anesthesia: MAC    Indications: Zackary Rodriguez is a 28 y.o. male with interstitial cystitis presenting for hydrodistension. He has been counseled on the risks, benefits, and alternatives and has elected to proceed with the procedure.     Findings:   RUG and flexible cysto showing normal urethra, no stricture noted, bladder with significant hyperemia. Hydro-distention performed, normal bladder capacity, no terminal hematuria noted.     Estimated Blood Loss: min    Drains: none    Procedure in detail: After appropriate informed consent was obtained, the patient was transferred to the operating room. Appropriate DVT prophylaxis was applied. Anesthesia was administered.  After adequate anesthesia the patient was placed in the dorsal lithotomy position and prepped and draped in the usual sterile fashion. Time out was preformed and kurt-procedural antibiotics were confirmed.    We first performed our retrograde urethrogram. In brief, a 60 cc catheter-tipped syringe was then inserted into the patient's meatus. The penis was held on stretch with a raytec. Intermittent fluoroscopy was used and showed contrast did  reach the bladder. The urethra showed no evidence of stricture. There were no urethrocutaneous fistulas noted.    We then performed formal flexibly cystourethroscopy which showed a normal urethra with no evidence of masses or strictures. The right and left ureteral orifices were identified in the normal anatomic position. Formal cystoscopy was performed, which  revealed no masses or lesions suspicious for malignancy, no trabeculations, no bladder stones and no bladder diverticuli. The bladder did however appear hyperemic.     The bladder was then filled with sterile saline until equilibrium was reached at 80 cm of water. The capacity of the bladder was >500cc. There was not terminal hematuria seen. There were no ulcerations seen. There were no glomerulations seen.    A 16Fr russ catheter was then used to drain the bladder, this was then subsequently removed.    The patient was removed from lithotomy and transferred to recovery in stable condition.    Disposition:  The patient will follow up with Dr. Agee in 2 weeks.  Prescriptions were given for pyridium and antibiotics.    Maninder Crawford MD

## 2024-07-22 NOTE — DISCHARGE INSTRUCTIONS
Please start using the IC smart diet. Please the the hydroxyzine and the antibiotic. Follow up with Dr. Agee in 4 months.

## 2024-07-22 NOTE — PROGRESS NOTES
IC (interstitial cystitis)  -     hydrOXYzine HCL (ATARAX) 25 MG tablet; Take 1 tablet (25 mg total) by mouth 3 (three) times daily.  Dispense: 90 tablet; Refill: 3  -     famotidine (PEPCID) 40 MG tablet; Take 1 tablet (40 mg total) by mouth nightly as needed for Heartburn.  Dispense: 30 tablet; Refill: 11       Newly diagnosed IC.  Explained the nature and treatment in depth with pt's mom.  Start hydroxyzine and pepcid daily.  Will hold off Elmiron and Elavil.  He can continue flomax.    Will see him in 4 months in clinic.

## 2024-07-22 NOTE — ANESTHESIA PREPROCEDURE EVALUATION
07/22/2024  Zackary Rodriguez is a 28 y.o., male.      Pre-op Assessment    I have reviewed the Patient Summary Reports.     I have reviewed the Nursing Notes. I have reviewed the NPO Status.   I have reviewed the Medications.     Review of Systems  Anesthesia Hx:             Denies Family Hx of Anesthesia complications.    Denies Personal Hx of Anesthesia complications.                      Physical Exam  General: Well nourished    Airway:  Mallampati: II   Mouth Opening: Normal  TM Distance: Normal    Chest/Lungs:  Normal Respiratory Rate    Anesthesia Plan  Type of Anesthesia, risks & benefits discussed:    Anesthesia Type: Gen Natural Airway  Intra-op Monitoring Plan: Standard ASA Monitors  Post Op Pain Control Plan: multimodal analgesia  Induction:  IV  Informed Consent: Informed consent signed with the Patient and all parties understand the risks and agree with anesthesia plan.  All questions answered.   ASA Score: 1  Day of Surgery Review of History & Physical: H&P Update referred to the surgeon/provider.    Ready For Surgery From Anesthesia Perspective.   .

## 2024-07-22 NOTE — INTERVAL H&P NOTE
The patient has been examined and the H&P has been reviewed:    I concur with the findings and no changes have occurred since H&P was written.    Anesthesia/Surgery risks, benefits and alternative options discussed and understood by patient/family.      UA today negative for leukocytes and nitrites. Patient denies any dysuria, fevers, or chills. Consent obtained. Will proceed to OR.       There are no hospital problems to display for this patient.

## 2024-08-30 ENCOUNTER — OFFICE VISIT (OUTPATIENT)
Dept: GASTROENTEROLOGY | Facility: CLINIC | Age: 29
End: 2024-08-30
Payer: COMMERCIAL

## 2024-08-30 VITALS
BODY MASS INDEX: 26.83 KG/M2 | HEART RATE: 64 BPM | HEIGHT: 68 IN | DIASTOLIC BLOOD PRESSURE: 90 MMHG | WEIGHT: 177 LBS | SYSTOLIC BLOOD PRESSURE: 137 MMHG

## 2024-08-30 DIAGNOSIS — R10.30 LOWER ABDOMINAL PAIN: ICD-10-CM

## 2024-08-30 DIAGNOSIS — R19.7 DIARRHEA, UNSPECIFIED TYPE: ICD-10-CM

## 2024-08-30 DIAGNOSIS — K21.9 GASTROESOPHAGEAL REFLUX DISEASE, UNSPECIFIED WHETHER ESOPHAGITIS PRESENT: Primary | ICD-10-CM

## 2024-08-30 PROCEDURE — 3080F DIAST BP >= 90 MM HG: CPT | Mod: CPTII,S$GLB,, | Performed by: NURSE PRACTITIONER

## 2024-08-30 PROCEDURE — 99999 PR PBB SHADOW E&M-EST. PATIENT-LVL III: CPT | Mod: PBBFAC,,, | Performed by: NURSE PRACTITIONER

## 2024-08-30 PROCEDURE — 99204 OFFICE O/P NEW MOD 45 MIN: CPT | Mod: S$GLB,,, | Performed by: NURSE PRACTITIONER

## 2024-08-30 PROCEDURE — 3008F BODY MASS INDEX DOCD: CPT | Mod: CPTII,S$GLB,, | Performed by: NURSE PRACTITIONER

## 2024-08-30 PROCEDURE — 1160F RVW MEDS BY RX/DR IN RCRD: CPT | Mod: CPTII,S$GLB,, | Performed by: NURSE PRACTITIONER

## 2024-08-30 PROCEDURE — 3075F SYST BP GE 130 - 139MM HG: CPT | Mod: CPTII,S$GLB,, | Performed by: NURSE PRACTITIONER

## 2024-08-30 PROCEDURE — 1159F MED LIST DOCD IN RCRD: CPT | Mod: CPTII,S$GLB,, | Performed by: NURSE PRACTITIONER

## 2024-08-30 NOTE — PROGRESS NOTES
Subjective:       Patient ID: Zackary Rodriguez is a 28 y.o. male.    Chief Complaint: Gastroesophageal Reflux, Abdominal Pain, and Diarrhea    29 y/o male with hx of interstitial cystitis presents to clinic with c/o lower abdominal pain, diarrhea, and reflux. Patient reports current symptoms have progressively worsened over the last few weeks. Has intermittent lower abdominal pain described as pressure. Discomfort is relieved by BM. Reports 2-4 loose BMs daily with vary stool color (greenish to brown). Denies recent ill contacts or antibiotic therapy. Has not tried any medications for symptoms. Occasional heartburn and nausea. Treated for reflux in the past with famotidine as needed. Attributes reflux to school related stress as he is studying [employer]. No specific food triggers. He is following a bland diet as a part of treatment of IC. He does not smoke or drink. No chronic NSAID. Father with hx of stomach ulcers. No FH IBD or colon cancer.        Past Medical History:   Diagnosis Date    GERD (gastroesophageal reflux disease)        Past Surgical History:   Procedure Laterality Date    CYSTOSCOPY WITH HYDRODISTENSION OF BLADDER  7/22/2024    Procedure: CYSTOSCOPY, WITH BLADDER HYDRODISTENSION;  Surgeon: Chemo Agee MD;  Location: [facility] OR;  Service: Urology;;    FLEXIBLE CYSTOSCOPY N/A 7/22/2024    Procedure: CYSTOSCOPY, FLEXIBLE;  Surgeon: Chemo Agee MD;  Location: [facility] OR;  Service: Urology;  Laterality: N/A;  45 min    SPINAL FUSION      URETHROGRAM, RETROGRADE N/A 7/22/2024    Procedure: URETHROGRAM, RETROGRADE;  Surgeon: Chemo Agee MD;  Location: [facility] OR;  Service: Urology;  Laterality: N/A;       Family History   Problem Relation Name Age of Onset    Ulcers Father         Social History     Socioeconomic History    Marital status: Single   Tobacco Use    Smoking status: Former    Smokeless tobacco: Never   Substance and Sexual Activity    Alcohol use: No    Drug use: No    Sexual activity:  "Not Currently     Social Determinants of Health     Financial Resource Strain: Low Risk  (6/4/2024)    Overall Financial Resource Strain (CARDIA)     Difficulty of Paying Living Expenses: Not hard at all   Food Insecurity: No Food Insecurity (6/4/2024)    Hunger Vital Sign     Worried About Running Out of Food in the Last Year: Never true     Ran Out of Food in the Last Year: Never true   Physical Activity: Insufficiently Active (6/4/2024)    Exercise Vital Sign     Days of Exercise per Week: 4 days     Minutes of Exercise per Session: 30 min   Stress: No Stress Concern Present (6/4/2024)    [employer] - Occupational Stress Questionnaire     Feeling of Stress : Not at all   Housing Stability: Unknown (6/4/2024)    Housing Stability Vital Sign     Unable to Pay for Housing in the Last Year: No       Review of Systems   Constitutional:  Negative for appetite change and unexpected weight change.   HENT:  Negative for trouble swallowing.    Respiratory:  Negative for shortness of breath.    Cardiovascular:  Negative for chest pain.   Gastrointestinal:  Positive for abdominal pain, diarrhea and nausea. Negative for blood in stool and vomiting.   Hematological:  Negative for adenopathy. Does not bruise/bleed easily.   Psychiatric/Behavioral:  Negative for dysphoric mood.          Objective:     Vitals:    08/30/24 1319   BP: (!) 137/90   BP Location: Right arm   Patient Position: Sitting   BP Method: Medium (Automatic)   Pulse: 64   Weight: 80.3 kg (177 lb 0.5 oz)   Height: 5' 8" (1.727 m)          Physical Exam  Constitutional:       General: He is not in acute distress.     Appearance: Normal appearance.   HENT:      Head: Normocephalic.   Eyes:      Conjunctiva/sclera: Conjunctivae normal.   Pulmonary:      Effort: Pulmonary effort is normal. No respiratory distress.   Musculoskeletal:         General: Normal range of motion.      Cervical back: Normal range of motion.   Skin:     General: " Skin is warm and dry.   Neurological:      Mental Status: He is alert and oriented to person, place, and time.   Psychiatric:         Mood and Affect: Mood normal.         Behavior: Behavior normal.               Assessment:         ICD-10-CM ICD-9-CM   1. Gastroesophageal reflux disease, unspecified whether esophagitis present  K21.9 530.81   2. Lower abdominal pain  R10.30 789.09   3. Diarrhea, unspecified type  R19.7 787.91       Plan:       Gastroesophageal reflux disease, unspecified whether esophagitis present  -     OTC famotidine bid prn  - Discussed elimination of dietary triggers (fatty foods, caffeine, chocolate, spicy foods, food with high fat content, carbonated beverages, and peppermint.  - Raise the head of your bed by 6 to 8 inches - You can do this by putting blocks of wood or rubber under 2 legs of the bed or a foam wedge under the mattress.  - Avoid late meals - Lying down with a full stomach can make reflux worse. Try to plan meals for at least 2 to 3 hours before bedtime.  - Avoid tight clothing - Some people feel better if they wear comfortable clothing that does not squeeze the stomach area.     Lower abdominal pain  -     CT Abdomen Pelvis With IV Contrast Routine Oral Contrast; Future; Expected date: 08/30/2024    Diarrhea, unspecified type  -     Clostridium difficile EIA; Future; Expected date: 08/30/2024  -     Giardia / Cryptosporidum, EIA; Future; Expected date: 08/30/2024  -     Pancreatic elastase, fecal; Future; Expected date: 08/30/2024  -     Stool culture; Future; Expected date: 08/30/2024  -     Stool Exam-Ova,Cysts,Parasites; Future; Expected date: 08/30/2024  -     WBC, Stool; Future; Expected date: 08/30/2024  -     H. pylori antigen, stool; Future; Expected date: 08/30/2024  -     Calprotectin, Stool; Future; Expected date: 08/30/2024  -     CBC Auto Differential; Future; Expected date: 08/30/2024  -     Comprehensive Metabolic Panel; Future; Expected date:  08/30/2024    Follow up if symptoms worsen or fail to improve.     Patient's Medications   New Prescriptions    No medications on file   Previous Medications    FAMOTIDINE (PEPCID) 40 MG TABLET    Take 1 tablet (40 mg total) by mouth nightly as needed for Heartburn.    HYDROXYZINE HCL (ATARAX) 25 MG TABLET    Take 1 tablet (25 mg total) by mouth 3 (three) times daily.   Modified Medications    No medications on file   Discontinued Medications    BACLOFEN (LYVISPAH) 10 MG GRPK    Take 10 mg by mouth 3 (three) times daily.    TAMSULOSIN (FLOMAX) 0.4 MG CAP    Take 0.4 mg by mouth once daily.

## 2024-09-03 ENCOUNTER — LAB VISIT (OUTPATIENT)
Dept: LAB | Facility: HOSPITAL | Age: 29
End: 2024-09-03
Attending: NURSE PRACTITIONER
Payer: COMMERCIAL

## 2024-09-03 DIAGNOSIS — R19.7 DIARRHEA, UNSPECIFIED TYPE: ICD-10-CM

## 2024-09-03 LAB
C DIFF GDH STL QL: NEGATIVE
C DIFF TOX A+B STL QL IA: NEGATIVE

## 2024-09-03 PROCEDURE — 87324 CLOSTRIDIUM AG IA: CPT | Mod: PN | Performed by: NURSE PRACTITIONER

## 2024-09-03 PROCEDURE — 87329 GIARDIA AG IA: CPT | Mod: PN | Performed by: NURSE PRACTITIONER

## 2024-09-03 PROCEDURE — 89055 LEUKOCYTE ASSESSMENT FECAL: CPT | Mod: PN | Performed by: NURSE PRACTITIONER

## 2024-09-03 PROCEDURE — 87045 FECES CULTURE AEROBIC BACT: CPT | Mod: PN | Performed by: NURSE PRACTITIONER

## 2024-09-03 PROCEDURE — 87427 SHIGA-LIKE TOXIN AG IA: CPT | Mod: 59,PN | Performed by: NURSE PRACTITIONER

## 2024-09-03 PROCEDURE — 82653 EL-1 FECAL QUANTITATIVE: CPT | Mod: PN | Performed by: NURSE PRACTITIONER

## 2024-09-03 PROCEDURE — 87449 NOS EACH ORGANISM AG IA: CPT | Mod: PN | Performed by: NURSE PRACTITIONER

## 2024-09-03 PROCEDURE — 83993 ASSAY FOR CALPROTECTIN FECAL: CPT | Mod: PN | Performed by: NURSE PRACTITIONER

## 2024-09-03 PROCEDURE — 87338 HPYLORI STOOL AG IA: CPT | Mod: PN | Performed by: NURSE PRACTITIONER

## 2024-09-03 PROCEDURE — 87328 CRYPTOSPORIDIUM AG IA: CPT | Mod: PN | Performed by: NURSE PRACTITIONER

## 2024-09-03 PROCEDURE — 87449 NOS EACH ORGANISM AG IA: CPT | Mod: 91,PN | Performed by: NURSE PRACTITIONER

## 2024-09-03 PROCEDURE — 87046 STOOL CULTR AEROBIC BACT EA: CPT | Mod: PN | Performed by: NURSE PRACTITIONER

## 2024-09-03 PROCEDURE — 87209 SMEAR COMPLEX STAIN: CPT | Mod: PN | Performed by: NURSE PRACTITIONER

## 2024-09-04 LAB
CRYPTOSP AG STL QL IA: NEGATIVE
E COLI SXT1 STL QL IA: NEGATIVE
E COLI SXT2 STL QL IA: NEGATIVE
G LAMBLIA AG STL QL IA: NEGATIVE
WBC #/AREA STL HPF: NORMAL /[HPF]

## 2024-09-06 ENCOUNTER — HOSPITAL ENCOUNTER (OUTPATIENT)
Dept: RADIOLOGY | Facility: HOSPITAL | Age: 29
Discharge: HOME | End: 2024-09-06
Attending: NURSE PRACTITIONER
Payer: COMMERCIAL

## 2024-09-06 DIAGNOSIS — R10.30 LOWER ABDOMINAL PAIN: ICD-10-CM

## 2024-09-06 LAB
BACTERIA STL CULT: NORMAL
CALPROTECTIN STL-MCNT: 144 MCG/G
ELASTASE 1, FECAL: 458 MCG/G

## 2024-09-06 PROCEDURE — 74177 CT ABD & PELVIS W/CONTRAST: CPT | Mod: TC,PN

## 2024-09-06 PROCEDURE — 25500020 PHARM REV CODE 255: Mod: PN | Performed by: NURSE PRACTITIONER

## 2024-09-06 PROCEDURE — 74177 CT ABD & PELVIS W/CONTRAST: CPT | Mod: 26,,, | Performed by: STUDENT IN AN ORGANIZED HEALTH CARE EDUCATION/TRAINING PROGRAM

## 2024-09-06 RX ADMIN — IOHEXOL 30 ML: 300 INJECTION, SOLUTION INTRAVENOUS at 09:09

## 2024-09-06 RX ADMIN — IOHEXOL 100 ML: 350 INJECTION, SOLUTION INTRAVENOUS at 09:09

## 2024-09-08 LAB — O+P STL MICRO: NORMAL

## 2024-09-13 ENCOUNTER — PATIENT MESSAGE (OUTPATIENT)
Dept: GASTROENTEROLOGY | Facility: CLINIC | Age: 29
End: 2024-09-13
Payer: COMMERCIAL

## 2024-09-13 DIAGNOSIS — A04.8 H. PYLORI INFECTION: Primary | ICD-10-CM

## 2024-09-13 LAB — H PYLORI AG STL QL IA: DETECTED

## 2024-09-13 RX ORDER — BISMUTH SUBCITRATE POTASSIUM, METRONIDAZOLE AND TETRACYCLINE HYDROCHLORIDE 140; 125; 125 MG/1; MG/1; MG/1
3 CAPSULE ORAL
Qty: 120 CAPSULE | Refills: 0 | Status: SHIPPED | OUTPATIENT
Start: 2024-09-13 | End: 2024-09-23

## 2024-09-13 RX ORDER — OMEPRAZOLE 40 MG/1
40 CAPSULE, DELAYED RELEASE ORAL 2 TIMES DAILY
Qty: 20 CAPSULE | Refills: 0 | Status: SHIPPED | OUTPATIENT
Start: 2024-09-13 | End: 2024-09-23

## 2024-10-10 DIAGNOSIS — A04.8 H. PYLORI INFECTION: Primary | ICD-10-CM

## 2024-10-16 ENCOUNTER — TELEPHONE (OUTPATIENT)
Dept: GASTROENTEROLOGY | Facility: CLINIC | Age: 29
End: 2024-10-16
Payer: COMMERCIAL

## 2024-10-16 NOTE — TELEPHONE ENCOUNTER
Attempted to contact patient, no answer left VM to return our call.    ----- Message from ALFREDO Jiang sent at 10/10/2024  4:13 PM CDT -----  Contact patient to repeat H. Pylori test.

## 2024-10-17 ENCOUNTER — TELEPHONE (OUTPATIENT)
Dept: GASTROENTEROLOGY | Facility: CLINIC | Age: 29
End: 2024-10-17
Payer: COMMERCIAL

## 2024-10-17 NOTE — TELEPHONE ENCOUNTER
Spoke with patient to inform him that he will need to stop by our clinic to  his stool kit to repeat his H. Pylori test. Patient stated that he will stop by Tuesday 10/22/2024 to  his kit.    ----- Message from Amina sent at 10/17/2024 10:15 AM CDT -----  Type:  Patient Returning Call    Who Called: Marianne Rodrigeuz   Who Left Message for Patient: Edith  Does the patient know what this is regarding?: Sample stool  Would the patient rather a call back or a response via [facility]?  call  Best Call Back Number:  [phone]  Additional Information:  Pt wants to know what next steps to take.

## 2024-10-25 ENCOUNTER — LAB VISIT (OUTPATIENT)
Dept: LAB | Facility: HOSPITAL | Age: 29
End: 2024-10-25
Attending: NURSE PRACTITIONER
Payer: COMMERCIAL

## 2024-10-25 DIAGNOSIS — A04.8 H. PYLORI INFECTION: ICD-10-CM

## 2024-10-25 PROCEDURE — 87338 HPYLORI STOOL AG IA: CPT | Mod: PN | Performed by: NURSE PRACTITIONER

## 2024-11-01 LAB — H PYLORI AG STL QL IA: NOT DETECTED

## (undated) DEVICE — GUIDE AMPLATZ .035IN 5X145CM

## (undated) DEVICE — DRAPE UINDERBUT GRAD PCH

## (undated) DEVICE — SET IRR URLGY 2LINE UNIV SPIKE

## (undated) DEVICE — TOWEL OR XRAY BLUE 17X26IN

## (undated) DEVICE — SYR 30CC LUER LOCK

## (undated) DEVICE — Device

## (undated) DEVICE — SYR 50ML CATH TIP

## (undated) DEVICE — TUBING SUC UNIV W/CONN 12FT

## (undated) DEVICE — PACK LITHOTOMY I ECLIPSE

## (undated) DEVICE — SOL IRR WATER STRL 3000 ML